# Patient Record
Sex: FEMALE | Race: WHITE | NOT HISPANIC OR LATINO | Employment: FULL TIME | URBAN - METROPOLITAN AREA
[De-identification: names, ages, dates, MRNs, and addresses within clinical notes are randomized per-mention and may not be internally consistent; named-entity substitution may affect disease eponyms.]

---

## 2017-02-20 ENCOUNTER — OFFICE VISIT (OUTPATIENT)
Dept: URGENT CARE | Facility: PHYSICIAN GROUP | Age: 32
End: 2017-02-20
Payer: COMMERCIAL

## 2017-02-20 VITALS
HEIGHT: 67 IN | TEMPERATURE: 97.9 F | WEIGHT: 199 LBS | SYSTOLIC BLOOD PRESSURE: 96 MMHG | DIASTOLIC BLOOD PRESSURE: 60 MMHG | HEART RATE: 89 BPM | OXYGEN SATURATION: 96 % | BODY MASS INDEX: 31.23 KG/M2 | RESPIRATION RATE: 16 BRPM

## 2017-02-20 DIAGNOSIS — H10.32 ACUTE BACTERIAL CONJUNCTIVITIS OF LEFT EYE: ICD-10-CM

## 2017-02-20 PROCEDURE — 99213 OFFICE O/P EST LOW 20 MIN: CPT | Performed by: FAMILY MEDICINE

## 2017-02-20 RX ORDER — POLYMYXIN B SULFATE AND TRIMETHOPRIM 1; 10000 MG/ML; [USP'U]/ML
1 SOLUTION OPHTHALMIC EVERY 4 HOURS
Qty: 1 BOTTLE | Refills: 0 | Status: SHIPPED | OUTPATIENT
Start: 2017-02-20 | End: 2017-09-22

## 2017-02-20 ASSESSMENT — ENCOUNTER SYMPTOMS
DOUBLE VISION: 0
SENSORY CHANGE: 0
WEIGHT LOSS: 0
PHOTOPHOBIA: 0
FOCAL WEAKNESS: 0
BLURRED VISION: 0

## 2017-02-20 NOTE — MR AVS SNAPSHOT
"        Stefany Barrios   2017 8:00 AM   Office Visit   MRN: [id number]    Department:  [facility]   Dept Phone:  [phone]    Description:  Female : 1985   Provider:  Babar Elliott M.D.           Reason for Visit     Eye Problem Lt eye itching, red and irritated - onset this morning      Allergies as of 2017     Allergen Noted Reactions    Latex 08/10/2015       Menthol 2016   Rash    Rash    Pcn [Penicillins] 08/10/2015         You were diagnosed with     Acute bacterial conjunctivitis of left eye   [[id number]]         Vital Signs     Blood Pressure Pulse Temperature Respirations Height Weight    96/60 mmHg 89 36.6 °C (97.9 °F) 16 1.702 m (5' 7\") 90.266 kg (199 lb)    Body Mass Index Oxygen Saturation Smoking Status             31.16 kg/m2 96% Never Smoker          Basic Information     Date Of Birth Sex Race Ethnicity Preferred Language    1985 Female White Non- English      Problem List              ICD-10-CM Priority Class Noted - Resolved    Infertility, female N97.9   8/10/2015 - Present    Prediabetes R73.03   8/10/2015 - Present    Overweight (BMI 25.0-29.9) E66.3   8/10/2015 - Present      Health Maintenance        Date Due Completion Dates    IMM INFLUENZA (1) 2016 ---    PAP SMEAR 2019, 2015 (Done)    Override on 2015: Done (Dr. Robertson-normal)    IMM DTaP/Tdap/Td Vaccine (2 - Td) 2026            Current Immunizations     Tdap Vaccine 2016      Below and/or attached are the medications your provider expects you to take. Review all of your home medications and newly ordered medications with your provider and/or pharmacist. Follow medication instructions as directed by your provider and/or pharmacist. Please keep your medication list with you and share with your provider. Update the information when medications are discontinued, doses are changed, or new medications (including over-the-counter products) are added; " and carry medication information at all times in the event of emergency situations     Allergies:  LATEX - (reactions not documented)     MENTHOL - Rash     PCN - (reactions not documented)               Medications  Valid as of: February 20, 2017 - 10:43 AM    Generic Name Brand Name Tablet Size Instructions for use    Ascorbic Acid   Take  by mouth.        Polymyxin B-Trimethoprim (Solution) POLYTRIM 65537-1.1 UNIT/ML-% Place 1 Drop in left eye every 4 hours. While awake        Prenatal MV-Min-Fe Fum-FA-DHA   Take  by mouth.        .                 Medicines prescribed today were sent to:     [id number] - [place], [place] - [address]    [address] [place] [address]    Phone: [phone] Fax: [phone]    Open 24 Hours?: No    [id number] - [place] - [address]    [address] [place] [address]    Phone: [phone] Fax: [phone]    Open 24 Hours?: No      Medication refill instructions:       If your prescription bottle indicates you have medication refills left, it is not necessary to call your provider’s office. Please contact your pharmacy and they will refill your medication.    If your prescription bottle indicates you do not have any refills left, you may request refills at any time through one of the following ways: The online [employer] system (except Urgent Care), by calling your provider’s office, or by asking your pharmacy to contact your provider’s office with a refill request. Medication refills are processed only during regular business hours and may not be available until the next business day. Your provider may request additional information or to have a follow-up visit with you prior to refilling your medication.   *Please Note: Medication refills are assigned a new Rx number when refilled electronically. Your pharmacy may indicate that no refills were authorized even though a new prescription for the same medication is available at the pharmacy. Please  request the medicine by name with the pharmacy before contacting your provider for a refill.           MyChart Access Code: Activation code not generated  Current MyChart Status: Active

## 2017-02-20 NOTE — Clinical Note
February 20, 2017         Patient: Stefany Barrios   YOB: 1985   Date of Visit: 2/20/2017           To Whom it May Concern:    Stefany Barrios was seen in my clinic on 2/20/2017. Please excuse today.      Sincerely,           Babar Elliott M.D.  Electronically Signed

## 2017-02-20 NOTE — PROGRESS NOTES
"Subjective:      Stefany Barrios is a 31 y.o. female who presents with Eye Problem            Eye Problem   The left eye is affected. This is a new problem. The current episode started today (left eye red, irritated, draining. Mild mattering this am. Pink eye exposure at home. No viral prodrome. No allergic trigger. No contact lens use. No vision loss. No trauma/FB. ). Pertinent negatives include no blurred vision, double vision, itching or photophobia.       Review of Systems   Constitutional: Negative for weight loss and malaise/fatigue.   Eyes: Negative for blurred vision, double vision and photophobia.   Skin: Negative for itching and rash.   Neurological: Negative for sensory change and focal weakness.          Objective:     BP 96/60 mmHg  Pulse 89  Temp(Src) 36.6 °C (97.9 °F)  Resp 16  Ht 1.702 m (5' 7\")  Wt 90.266 kg (199 lb)  BMI 31.16 kg/m2  SpO2 96%     Physical Exam   Constitutional: She appears well-developed and well-nourished. No distress.   HENT:   Head: Normocephalic and atraumatic.   Right Ear: External ear normal.   L conjunctiva injected with moderate exudate. No foreign body. No gross corneal lesion.     Eyes: Conjunctivae are normal.   Neck: Neck supple.   Cardiovascular: Normal rate, regular rhythm and normal heart sounds.    Pulmonary/Chest: Effort normal and breath sounds normal.   Lymphadenopathy:     She has no cervical adenopathy.   Neurological:   Speech is clear. Patient is appropriate and cooperative.     Skin: Skin is warm and dry. No rash noted.               Assessment/Plan:     There are no diagnoses linked to this encounter.      "

## 2017-06-25 ENCOUNTER — OFFICE VISIT (OUTPATIENT)
Dept: URGENT CARE | Facility: CLINIC | Age: 32
End: 2017-06-25
Payer: COMMERCIAL

## 2017-06-25 VITALS
HEIGHT: 67 IN | BODY MASS INDEX: 31.55 KG/M2 | SYSTOLIC BLOOD PRESSURE: 112 MMHG | TEMPERATURE: 98.4 F | HEART RATE: 94 BPM | OXYGEN SATURATION: 97 % | RESPIRATION RATE: 14 BRPM | DIASTOLIC BLOOD PRESSURE: 70 MMHG | WEIGHT: 201 LBS

## 2017-06-25 DIAGNOSIS — L50.9 URTICARIA: ICD-10-CM

## 2017-06-25 DIAGNOSIS — R21 RASH: ICD-10-CM

## 2017-06-25 PROCEDURE — 99214 OFFICE O/P EST MOD 30 MIN: CPT | Performed by: PHYSICIAN ASSISTANT

## 2017-06-25 RX ORDER — DEXAMETHASONE 2 MG/1
2 TABLET ORAL DAILY
Qty: 5 TAB | Refills: 0 | Status: SHIPPED | OUTPATIENT
Start: 2017-06-25 | End: 2017-09-22

## 2017-06-25 RX ORDER — METHYLPREDNISOLONE SODIUM SUCCINATE 125 MG/2ML
125 INJECTION, POWDER, LYOPHILIZED, FOR SOLUTION INTRAMUSCULAR; INTRAVENOUS ONCE
Status: COMPLETED | OUTPATIENT
Start: 2017-06-25 | End: 2017-06-25

## 2017-06-25 RX ADMIN — METHYLPREDNISOLONE SODIUM SUCCINATE 125 MG: 125 INJECTION, POWDER, LYOPHILIZED, FOR SOLUTION INTRAMUSCULAR; INTRAVENOUS at 11:54

## 2017-06-25 ASSESSMENT — ENCOUNTER SYMPTOMS
HEADACHES: 0
COUGH: 0
MYALGIAS: 0
FATIGUE: 0
RHINORRHEA: 0
EYE PAIN: 0
VOMITING: 0
SHORTNESS OF BREATH: 0
FEVER: 0
SORE THROAT: 0
DIARRHEA: 0

## 2017-06-25 NOTE — PROGRESS NOTES
"Subjective:      Stefany Barrios is a 32 y.o. female who presents with Insect Bite          Pt is 31 y/o female who presents with right upper lip swelling after suspect insect bite yesterday. She then reports that she developed hives a few hours later with intense itching. She note that she has taken 2 dosages of benadryl with improvement of her lip swelling however her hives are still intense. She denies any SOB, fevers, wheezing, or any chest tightness or throat swelling .  Rash  This is a new problem. Episode onset: last night.  Progression since onset: Lip swelling has improved. However hives are worse. The affected locations include the lips, chest, torso and back. The rash is characterized by redness and itchiness. She was exposed to an insect bite/sting (Suspected insect bite). Associated symptoms include facial edema. Pertinent negatives include no congestion, cough, diarrhea, eye pain, fatigue, fever, joint pain, rhinorrhea, shortness of breath, sore throat or vomiting. Past treatments include antihistamine. The treatment provided moderate relief.       Review of Systems   Constitutional: Negative for fever, malaise/fatigue and fatigue.   HENT: Negative for congestion, ear discharge, ear pain, rhinorrhea and sore throat.    Eyes: Negative for pain.   Respiratory: Negative for cough and shortness of breath.    Cardiovascular: Negative for chest pain and leg swelling.   Gastrointestinal: Negative for vomiting and diarrhea.   Genitourinary: Negative for dysuria and urgency.   Musculoskeletal: Negative for myalgias and joint pain.   Skin: Positive for itching and rash.   Neurological: Negative for headaches.          Objective:     /70 mmHg  Pulse 94  Temp(Src) 36.9 °C (98.4 °F)  Resp 14  Ht 1.702 m (5' 7\")  Wt 91.173 kg (201 lb)  BMI 31.47 kg/m2  SpO2 97%  LMP 06/09/2017  Breastfeeding? Yes   PMH:  has a past medical history of Diabetes (CMS-[facility]).  MEDS:   Current outpatient prescriptions:   •  " dexamethasone (DECADRON) 2 MG tablet, Take 1 Tab by mouth every day., Disp: 5 Tab, Rfl: 0  •  Prenatal MV-Min-Fe Fum-FA-DHA (PRENATAL 1 PO), Take  by mouth., Disp: , Rfl:   •  Ascorbic Acid (VITAMIN C PO), Take  by mouth., Disp: , Rfl:   •  polymixin-trimethoprim (POLYTRIM) 51636-7.1 UNIT/ML-% Solution, Place 1 Drop in left eye every 4 hours. While awake, Disp: 1 Bottle, Rfl: 0  ALLERGIES:   Allergies   Allergen Reactions   • Latex    • Menthol Rash     Rash   • Pcn [Penicillins]      SURGHX: History reviewed. No pertinent past surgical history.  SOCHX:  reports that she has never smoked. She does not have any smokeless tobacco history on file. She reports that she drinks alcohol.  FH: Family history was reviewed, no pertinent findings to report    Physical Exam   Constitutional: She is oriented to person, place, and time. She appears well-developed and well-nourished.   HENT:   Head: Normocephalic.   Right Ear: External ear normal.   Left Ear: External ear normal.   Nose: Nose normal.   Mouth/Throat: Oropharynx is clear and moist.   Right upper lip- minimal edema- without noted puncture or discrete bite noted . Without any edema to pharynx.    Eyes: EOM are normal. Pupils are equal, round, and reactive to light.   Neck: Normal range of motion. Neck supple.   Cardiovascular: Normal rate and regular rhythm.    No murmur heard.  Pulmonary/Chest: Effort normal and breath sounds normal. No respiratory distress. She has no wheezes.   Neurological: She is alert and oriented to person, place, and time.   Skin: Rash noted.   Diffuse urticarial type rash to upper arms, neck, back, and upper chest. Without noted excoriations, increased warmth, or streaking.    Psychiatric: She has a normal mood and affect. Her behavior is normal.   Vitals reviewed.              Assessment/Plan:     1. Rash  - methylPREDNISolone sod succ (SOLU-MEDROL) 125 MG injection 125 mg; 2 mL by Intramuscular route Once.  - dexamethasone (DECADRON) 2 MG  tablet; Take 1 Tab by mouth every day.  Dispense: 5 Tab; Refill: 0    2. Urticaria    - methylPREDNISolone sod succ (SOLU-MEDROL) 125 MG injection 125 mg; 2 mL by Intramuscular route Once.  - dexamethasone (DECADRON) 2 MG tablet; Take 1 Tab by mouth every day.  Dispense: 5 Tab; Refill: 0    Uncertain etiology of right lip swelling- does not appear to have puncture wound nor bite to the area.   Do suspect that this triggered urticarial rxn.   Pt. Is to continue with Benadryl and start on Zantac OTC.   Finally pt. Was given VERY STRICT ER precautions.   Patient given precautionary s/sx that mandate immediate follow up and evaluation in the ED. Advised of risks of not doing so.    DDX, Supportive care, and indications for immediate follow-up discussed with patient.    Instructed to return to clinic or nearest emergency department if we are not available for any change in condition, further concerns, or worsening of symptoms.    The patient demonstrated a good understanding and agreed with the treatment plan.

## 2017-06-25 NOTE — MR AVS SNAPSHOT
"        Stefany Barrios   2017 11:00 AM   Office Visit   MRN: [id number]    Department:  [facility]   Dept Phone:  [phone]    Description:  Female : 1985   Provider:  Daniel Shelton PA-C           Reason for Visit     Insect Bite last night on upper right lip, pt states she woke up this morning with hives on upper body      Allergies as of 2017     Allergen Noted Reactions    Latex 08/10/2015       Menthol 2016   Rash    Rash    Pcn [Penicillins] 08/10/2015         You were diagnosed with     Rash   [[id number]]       Urticaria   [[id number]]         Vital Signs     Blood Pressure Pulse Temperature Respirations Height Weight    112/70 mmHg 94 36.9 °C (98.4 °F) 14 1.702 m (5' 7\") 91.173 kg (201 lb)    Body Mass Index Oxygen Saturation Last Menstrual Period Breastfeeding? Smoking Status       31.47 kg/m2 97% 2017 Yes Never Smoker        Basic Information     Date Of Birth Sex Race Ethnicity Preferred Language    1985 Female White Non- English      Problem List              ICD-10-CM Priority Class Noted - Resolved    Infertility, female N97.9   8/10/2015 - Present    Prediabetes R73.03   8/10/2015 - Present    Overweight (BMI 25.0-29.9) E66.3   8/10/2015 - Present      Health Maintenance        Date Due Completion Dates    PAP SMEAR 2019, 2015 (Done)    Override on 2015: Done (Dr. Robertson-normal)    IMM DTaP/Tdap/Td Vaccine (2 - Td) 2026            Current Immunizations     Tdap Vaccine 2016      Below and/or attached are the medications your provider expects you to take. Review all of your home medications and newly ordered medications with your provider and/or pharmacist. Follow medication instructions as directed by your provider and/or pharmacist. Please keep your medication list with you and share with your provider. Update the information when medications are discontinued, doses are changed, or new medications (including " over-the-counter products) are added; and carry medication information at all times in the event of emergency situations     Allergies:  LATEX - (reactions not documented)     MENTHOL - Rash     PCN - (reactions not documented)               Medications  Valid as of: June 25, 2017 -  1:00 PM    Generic Name Brand Name Tablet Size Instructions for use    Ascorbic Acid   Take  by mouth.        Dexamethasone (Tab) DECADRON 2 MG Take 1 Tab by mouth every day.        Polymyxin B-Trimethoprim (Solution) POLYTRIM 21493-9.1 UNIT/ML-% Place 1 Drop in left eye every 4 hours. While awake        Prenatal MV-Min-Fe Fum-FA-DHA   Take  by mouth.        .                 Medicines prescribed today were sent to:     [id number] - [place], [place] - [address]    [address] [place] [address]    Phone: [phone] Fax: [phone]    Open 24 Hours?: No      Medication refill instructions:       If your prescription bottle indicates you have medication refills left, it is not necessary to call your provider’s office. Please contact your pharmacy and they will refill your medication.    If your prescription bottle indicates you do not have any refills left, you may request refills at any time through one of the following ways: The online "[employer] system (except Urgent Care), by calling your provider’s office, or by asking your pharmacy to contact your provider’s office with a refill request. Medication refills are processed only during regular business hours and may not be available until the next business day. Your provider may request additional information or to have a follow-up visit with you prior to refilling your medication.   *Please Note: Medication refills are assigned a new Rx number when refilled electronically. Your pharmacy may indicate that no refills were authorized even though a new prescription for the same medication is available at the pharmacy. Please request the medicine by name with the  pharmacy before contacting your provider for a refill.           MyChart Access Code: Activation code not generated  Current MyChart Status: Active

## 2017-09-22 ENCOUNTER — OFFICE VISIT (OUTPATIENT)
Dept: MEDICAL GROUP | Facility: PHYSICIAN GROUP | Age: 32
End: 2017-09-22
Payer: COMMERCIAL

## 2017-09-22 VITALS
WEIGHT: 200 LBS | SYSTOLIC BLOOD PRESSURE: 112 MMHG | DIASTOLIC BLOOD PRESSURE: 78 MMHG | HEART RATE: 92 BPM | HEIGHT: 67 IN | RESPIRATION RATE: 14 BRPM | BODY MASS INDEX: 31.39 KG/M2 | OXYGEN SATURATION: 100 % | TEMPERATURE: 97.9 F

## 2017-09-22 DIAGNOSIS — Z13.89 SCREENING FOR CARDIOVASCULAR, RESPIRATORY, AND GENITOURINARY DISEASES: ICD-10-CM

## 2017-09-22 DIAGNOSIS — E66.9 OBESITY (BMI 30-39.9): ICD-10-CM

## 2017-09-22 DIAGNOSIS — F41.9 ANXIETY: ICD-10-CM

## 2017-09-22 DIAGNOSIS — Z13.83 SCREENING FOR CARDIOVASCULAR, RESPIRATORY, AND GENITOURINARY DISEASES: ICD-10-CM

## 2017-09-22 DIAGNOSIS — Z13.6 SCREENING FOR CARDIOVASCULAR, RESPIRATORY, AND GENITOURINARY DISEASES: ICD-10-CM

## 2017-09-22 PROCEDURE — 99214 OFFICE O/P EST MOD 30 MIN: CPT | Performed by: NURSE PRACTITIONER

## 2017-09-22 ASSESSMENT — PATIENT HEALTH QUESTIONNAIRE - PHQ9: CLINICAL INTERPRETATION OF PHQ2 SCORE: 0

## 2017-09-22 NOTE — LETTER
September 22, 2017       Patient: Stefany Barrios   YOB: 1985   Date of Visit: 9/22/2017         To Whom It May Concern:    It is my medical opinion that Stefany Barrios may benefit from having a  dog due to a medical condition.    If you have any questions or concerns, please don't hesitate to call [phone]          Sincerely,          SOCORRO Busch.  Electronically Signed

## 2017-09-25 NOTE — PROGRESS NOTES
Chief Complaint   Patient presents with   • Establish Care   • Other     letter for  animal        HPI:    Stefany Barrios is a 32 y.o. female here to establish care and to discuss the following:    Anxiety  Patient has anxiety, but is currently not taking medications and would prefer not to. She denies chest tightness, shortness of breath or panic attacks. We discussed mindfulness therapy and breathing.      Current medicines (including changes today)  Current Outpatient Prescriptions   Medication Sig Dispense Refill   • Ascorbic Acid (VITAMIN C PO) Take  by mouth.     • Prenatal MV-Min-Fe Fum-FA-DHA (PRENATAL 1 PO) Take  by mouth.       No current facility-administered medications for this visit.      She  has a past medical history of Diabetes (CMS-[facility]).  She  has no past surgical history on file.  Social History   Substance Use Topics   • Smoking status: Never Smoker   • Smokeless tobacco: Never Used   • Alcohol use Yes      Comment: socially     Social History     Social History Narrative   • No narrative on file     Family History   Problem Relation Age of Onset   • Diabetes Mother      prediabetes   • Cancer Mother      skin   • Diabetes Maternal Grandfather      Family Status   Relation Status   • Mother Alive   • Maternal Grandfather Alive   • Paternal Grandfather          ROS    Review of Systems   Constitutional: Negative for fever, chills, weight loss and malaise/fatigue.   HENT: Negative for ear pain, nosebleeds, congestion, sore throat and neck pain.    Eyes: Negative for blurred vision.   Respiratory: Negative for cough, sputum production, shortness of breath and wheezing.    Cardiovascular: Negative for chest pain, palpitations,  and leg swelling.   Gastrointestinal: Negative for heartburn, nausea, vomiting, diarrhea and abdominal pain.   Genitourinary: Negative for dysuria, urgency and frequency.   Musculoskeletal: Negative for myalgias, back pain and joint pain.   Skin: Negative for  "rash and itching.   Neurological: Negative for dizziness, tingling, tremors, sensory change, focal weakness and headaches.   Endo/Heme/Allergies: Does not bruise/bleed easily.   Psychiatric/Behavioral: Negative for depression, anxiety, suicidal ideas, insomnia and memory loss.      All other systems reviewed and are negative except as in HPI.     Objective:     Blood pressure 112/78, pulse 92, temperature 36.6 °C (97.9 °F), resp. rate 14, height 1.702 m (5' 7\"), weight 90.7 kg (200 lb), last menstrual period 08/01/2017, SpO2 100 %, not currently breastfeeding. Body mass index is 31.32 kg/m².  Physical Exam:  Constitutional: Alert, no distress.  Skin: Warm, dry, good turgor, no rashes in visible areas.  Eye: Equal, round and reactive, conjunctiva clear, lids normal.  ENMT: Lips without lesions, good dentition, oropharynx clear. Ear canals are clear, TMs within normal limits bilaterally.   Neck: Trachea midline, no masses, no thyromegaly. No cervical or supraclavicular lymphadenopathy.  Respiratory: Unlabored respiratory effort, lungs clear to auscultation, no wheezes, no ronchi.  Cardiovascular: Normal S1, S2, no murmur, no edema.  Abdomen: Soft, non-tender, no masses, no hepatosplenomegaly.  Psych: Alert and oriented x3, normal affect and mood.  MS: Ambulates independently with steady gait. Has full range of motion of all extremities and spine.  Neuro:  Cranial nerves intact. DTRs within normal limits. No neurological deficits.        Assessment and Plan:   The following treatment plan was discussed   1. Obesity (BMI 30-39.9)  Patient identified as having weight management issue.  Appropriate orders and counseling given.    discussed diet and exercise   2. Screening for cardiovascular, respiratory, and genitourinary diseases  COMP METABOLIC PANEL    LIPID PROFILE    labs ordered   3. Anxiety      discussed mindfulness therapy       Followup: Return in about 1 year (around 9/22/2018), or if symptoms worsen or fail to " improve, for AWV/HRA.   Please note that this dictation was created using voice recognition software. I have made every reasonable attempt to correct obvious errors, but I expect that there are errors of grammar and possibly content that I did not discover before finalizing the note.

## 2017-10-09 NOTE — ASSESSMENT & PLAN NOTE
Patient has anxiety, but is currently not taking medications and would prefer not to. She denies chest tightness, shortness of breath or panic attacks. We discussed mindfulness therapy and breathing.

## 2019-08-24 ENCOUNTER — OFFICE VISIT (OUTPATIENT)
Dept: URGENT CARE | Facility: PHYSICIAN GROUP | Age: 34
End: 2019-08-24
Payer: COMMERCIAL

## 2019-08-24 VITALS
WEIGHT: 190.2 LBS | HEART RATE: 77 BPM | SYSTOLIC BLOOD PRESSURE: 112 MMHG | BODY MASS INDEX: 29.79 KG/M2 | TEMPERATURE: 98.9 F | DIASTOLIC BLOOD PRESSURE: 76 MMHG | OXYGEN SATURATION: 99 %

## 2019-08-24 DIAGNOSIS — L02.212 ABSCESS OF UPPER BACK EXCLUDING SCAPULAR REGION: ICD-10-CM

## 2019-08-24 PROCEDURE — 99214 OFFICE O/P EST MOD 30 MIN: CPT | Performed by: PHYSICIAN ASSISTANT

## 2019-08-24 RX ORDER — SULFAMETHOXAZOLE AND TRIMETHOPRIM 800; 160 MG/1; MG/1
1 TABLET ORAL 2 TIMES DAILY
Qty: 20 TAB | Refills: 0 | Status: SHIPPED | OUTPATIENT
Start: 2019-08-24

## 2019-08-25 ASSESSMENT — ENCOUNTER SYMPTOMS: FEVER: 0

## 2019-08-25 NOTE — PROGRESS NOTES
Subjective:      Stefany Barrios is a 34 y.o. female who presents with Cyst (possible abscess on right shoulder x 3 weeks)    PMH:  has a past medical history of Diabetes (HCC).  MEDS:   Current Outpatient Medications:   •  Ascorbic Acid (VITAMIN C PO), Take  by mouth., Disp: , Rfl:   •  Prenatal MV-Min-Fe Fum-FA-DHA (PRENATAL 1 PO), Take  by mouth., Disp: , Rfl:   ALLERGIES:   Allergies   Allergen Reactions   • Latex    • Menthol Rash     Rash   • Pcn [Penicillins]      All cillians     SURGHX: History reviewed. No pertinent surgical history.  SOCHX:  reports that she has never smoked. She has never used smokeless tobacco. She reports that she drinks alcohol.  FH: Reviewed with patient, not pertinent to this visit.           Patient presents with:  Cyst: possible abscess on right shoulder x 3 weeks. Pt states her  tried to drain it with a needle, and did get a lot of pus from it, but now sore and red and swollen.  Thinks she needs some abx.         Cyst   This is a new problem. The current episode started 1 to 4 weeks ago. The problem occurs constantly. The problem has been gradually worsening. Pertinent negatives include no fever. Exacerbated by: palpation, pressure on cyst. Treatments tried: tried to drain it. The treatment provided mild relief.       Review of Systems   Constitutional: Negative for fever.   Skin:        Cyst to upper back   All other systems reviewed and are negative.         Objective:     /76 (BP Location: Left arm, Patient Position: Sitting, BP Cuff Size: Adult)   Pulse 77   Temp 37.2 °C (98.9 °F) (Temporal)   Wt 86.3 kg (190 lb 3.2 oz)   SpO2 99%   BMI 29.79 kg/m²      Physical Exam   Constitutional: She is oriented to person, place, and time. She appears well-developed and well-nourished.   HENT:   Head: Normocephalic and atraumatic.   Nose: Nose normal.   Eyes: Pupils are equal, round, and reactive to light. Conjunctivae and EOM are normal.   Neck: Normal range of motion.  Neck supple.   Cardiovascular: Normal rate, regular rhythm and normal heart sounds.   Pulmonary/Chest: Effort normal and breath sounds normal.   Abdominal: Soft.   Musculoskeletal: Normal range of motion.   Neurological: She is alert and oriented to person, place, and time. Gait normal.   Skin: Skin is warm and dry. Capillary refill takes less than 2 seconds.        Psychiatric: She has a normal mood and affect.   Nursing note and vitals reviewed.              Assessment/Plan:     1. Abscess of upper back excluding scapular region  sulfamethoxazole-trimethoprim (BACTRIM DS) 800-160 MG tablet     No need for I and D but antibiotics are indicated.   Pt instructed not to try to drain herself in future.     Motrin/Advil/Ibuprophen 600 mg every 6 hours as needed for pain or fever.    PT can use cool/warm compress on affected area for relief of symptoms.     PT should follow up with PCP in 1-2 days for re-evaluation if symptoms have not improved.  Discussed red flags and reasons to return to UC or ED.  Pt and/or family verbalized understanding of diagnosis and follow up instructions and was offered informational handout on diagnosis.  PT discharged.

## 2019-09-02 ENCOUNTER — OFFICE VISIT (OUTPATIENT)
Dept: URGENT CARE | Facility: PHYSICIAN GROUP | Age: 34
End: 2019-09-02
Payer: COMMERCIAL

## 2019-09-02 VITALS
HEIGHT: 67 IN | HEART RATE: 76 BPM | BODY MASS INDEX: 29.98 KG/M2 | RESPIRATION RATE: 16 BRPM | DIASTOLIC BLOOD PRESSURE: 72 MMHG | SYSTOLIC BLOOD PRESSURE: 122 MMHG | TEMPERATURE: 98 F | WEIGHT: 191 LBS | OXYGEN SATURATION: 96 %

## 2019-09-02 DIAGNOSIS — L72.3 INFLAMED SEBACEOUS CYST: ICD-10-CM

## 2019-09-02 DIAGNOSIS — I88.9 OCCIPITAL LYMPHADENITIS: ICD-10-CM

## 2019-09-02 DIAGNOSIS — T50.905A ALLERGIC REACTION DUE TO CORRECT MEDICINAL SUBSTANCE PROPERLY ADMINISTERED: ICD-10-CM

## 2019-09-02 PROCEDURE — 99214 OFFICE O/P EST MOD 30 MIN: CPT | Performed by: EMERGENCY MEDICINE

## 2019-09-02 RX ORDER — DOXYCYCLINE HYCLATE 100 MG
100 TABLET ORAL 2 TIMES DAILY
Qty: 14 TAB | Refills: 0 | Status: SHIPPED | OUTPATIENT
Start: 2019-09-02

## 2019-09-02 ASSESSMENT — ENCOUNTER SYMPTOMS
MYALGIAS: 0
VOMITING: 0
FEVER: 0
DIARRHEA: 1
BLOOD IN STOOL: 0
HEADACHES: 0
COUGH: 0
SHORTNESS OF BREATH: 0
SORE THROAT: 0

## 2019-09-02 NOTE — PROGRESS NOTES
Subjective:      Stefany Barrios is a 34 y.o. female who presents with Nodule (Painful lump on base of neck x4days )            Other   This is a new problem. Episode onset: 4 days. The problem occurs daily. The problem has been unchanged. Associated symptoms include a rash. Pertinent negatives include no congestion, coughing, fever, headaches, myalgias, sore throat or vomiting. Nothing aggravates the symptoms. She has tried nothing for the symptoms.   Rash   This is a new problem. The current episode started today. The problem has been gradually worsening since onset. The affected locations include the scalp, neck, torso, left arm and right arm. The rash is characterized by redness and itchiness. She was exposed to a new medication. Associated symptoms include diarrhea. Pertinent negatives include no congestion, cough, facial edema, fever, joint pain, shortness of breath, sore throat or vomiting. Past treatments include nothing.   Patient had infected right upper back sebaceous cyst; after drainage had been prescribed Bactrim.  Notes mildly tender lumps occipital regions, today noted rash, minimally itchy.    Review of Systems   Constitutional: Negative for fever.   HENT: Negative for congestion and sore throat.    Respiratory: Negative for cough and shortness of breath.    Gastrointestinal: Positive for diarrhea. Negative for blood in stool and vomiting.   Musculoskeletal: Negative for joint pain and myalgias.   Skin: Positive for itching and rash.   Neurological: Negative for headaches.     PMH:  has a past medical history of Diabetes ([facility]).  MEDS:   Current Outpatient Medications:   •  doxycycline (VIBRAMYCIN) 100 MG Tab, Take 1 Tab by mouth 2 times a day., Disp: 14 Tab, Rfl: 0  •  sulfamethoxazole-trimethoprim (BACTRIM DS) 800-160 MG tablet, Take 1 Tab by mouth 2 times a day., Disp: 20 Tab, Rfl: 0  •  Ascorbic Acid (VITAMIN C PO), Take  by mouth., Disp: , Rfl:   •  Prenatal MV-Min-Fe Fum-FA-DHA (PRENATAL 1 PO),  "Take  by mouth., Disp: , Rfl:   ALLERGIES:   Allergies   Allergen Reactions   • Latex    • Menthol Rash     Rash   • Pcn [Penicillins]      All cillians     SURGHX: History reviewed. No pertinent surgical history.  SOCHX:  reports that she has never smoked. She has never used smokeless tobacco. She reports that she drinks alcohol.  FH: family history includes Cancer in her mother; Diabetes in her maternal grandfather and mother.     Objective:     /72   Pulse 76   Temp 36.7 °C (98 °F) (Temporal)   Resp 16   Ht 1.702 m (5' 7\")   Wt 86.6 kg (191 lb)   SpO2 96%   Breastfeeding? No   BMI 29.91 kg/m²      Physical Exam   Constitutional: She is oriented to person, place, and time. She appears well-developed and well-nourished. She is cooperative. She does not have a sickly appearance. She does not appear ill. No distress.   Cardiovascular: Normal rate, regular rhythm and normal heart sounds.   Pulmonary/Chest: Effort normal and breath sounds normal.   Abdominal: She exhibits no distension.   Lymphadenopathy:        Head (right side): Posterior auricular and occipital adenopathy present.        Head (left side): Posterior auricular and occipital adenopathy present.     She has no cervical adenopathy.   Neurological: She is alert and oriented to person, place, and time.   Skin: Skin is warm and dry. Lesion and rash noted. Rash is maculopapular.        Coalescent macular, minimally papular eruption occipital region, neck, bilateral forearms.  No petechia, no cellulitis.   Psychiatric: She has a normal mood and affect.               Assessment/Plan:     1. Allergic reaction due to correct medicinal substance properly administered  Discontinue Bactrim; avoid future sulfa medication use.  OTC cetirizine, cool compresses as needed.  2. Occipital lymphadenitis  Recheck if not resolving after allergy process gone.  3. Inflamed sebaceous cyst  Warm compresses.  If not resolving, consider adding in 2 to 3 days:  - " doxycycline (VIBRAMYCIN) 100 MG Tab; Take 1 Tab by mouth 2 times a day.  Dispense: 14 Tab; Refill: 0

## 2019-10-10 ENCOUNTER — OFFICE VISIT (OUTPATIENT)
Dept: URGENT CARE | Facility: PHYSICIAN GROUP | Age: 34
End: 2019-10-10
Payer: COMMERCIAL

## 2019-10-10 VITALS
RESPIRATION RATE: 16 BRPM | SYSTOLIC BLOOD PRESSURE: 106 MMHG | HEART RATE: 101 BPM | OXYGEN SATURATION: 96 % | HEIGHT: 67 IN | BODY MASS INDEX: 29.98 KG/M2 | DIASTOLIC BLOOD PRESSURE: 78 MMHG | WEIGHT: 191 LBS | TEMPERATURE: 98.7 F
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
## 2019-10-10 DIAGNOSIS — S16.1XXA CERVICAL MUSCLE STRAIN, INITIAL ENCOUNTER: Primary | ICD-10-CM

## 2019-10-10 PROCEDURE — 99214 OFFICE O/P EST MOD 30 MIN: CPT | Performed by: PHYSICIAN ASSISTANT

## 2019-10-10 RX ORDER — CYCLOBENZAPRINE HCL 10 MG
10 TABLET ORAL 3 TIMES DAILY PRN
Qty: 30 TAB | Refills: 0 | Status: SHIPPED | OUTPATIENT
Start: 2019-10-10

## 2019-10-10 NOTE — LETTER
October 10, 2019         Patient: Stefany Barrios   YOB: 1985   Date of Visit: 10/10/2019           To Whom it May Concern:    Stefany Barrios was seen in my clinic on 10/10/2019. She may return to work on 10/14/2019 or sooner if condition improves sooner.       If you have any questions or concerns, please don't hesitate to call.        Sincerely,           Felecia Cheney P.A.-C.  Electronically Signed

## 2019-10-12 ASSESSMENT — ENCOUNTER SYMPTOMS
VISUAL CHANGE: 0
NUMBNESS: 0
ABDOMINAL PAIN: 0
HEADACHES: 1
ANOREXIA: 0
NECK PAIN: 1
MYALGIAS: 1
VERTIGO: 0
FEVER: 0
CHANGE IN BOWEL HABIT: 0
ARTHRALGIAS: 0
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
## 2020-03-31 ENCOUNTER — OFFICE VISIT (OUTPATIENT)
Dept: URGENT CARE | Facility: PHYSICIAN GROUP | Age: 35
End: 2020-03-31
Payer: COMMERCIAL

## 2020-03-31 VITALS
SYSTOLIC BLOOD PRESSURE: 94 MMHG | HEIGHT: 67 IN | OXYGEN SATURATION: 98 % | TEMPERATURE: 97.6 F | WEIGHT: 190 LBS | BODY MASS INDEX: 29.82 KG/M2 | DIASTOLIC BLOOD PRESSURE: 72 MMHG | HEART RATE: 86 BPM

## 2020-03-31 DIAGNOSIS — H93.8X2 SENSATION OF FULLNESS IN LEFT EAR: ICD-10-CM

## 2020-03-31 PROCEDURE — 99213 OFFICE O/P EST LOW 20 MIN: CPT | Performed by: PHYSICIAN ASSISTANT

## 2020-03-31 RX ORDER — LORATADINE 10 MG/1
10 TABLET ORAL DAILY
COMMUNITY

## 2020-03-31 ASSESSMENT — ENCOUNTER SYMPTOMS
SORE THROAT: 0
MUSCULOSKELETAL NEGATIVE: 1
FEVER: 0
DIZZINESS: 0
CHILLS: 0
NAUSEA: 0
ABDOMINAL PAIN: 0
COUGH: 0
SHORTNESS OF BREATH: 0
SPUTUM PRODUCTION: 0
VOMITING: 0
DIARRHEA: 0

## 2020-03-31 NOTE — PROGRESS NOTES
"Subjective:      Stefany Barrios is a 34 y.o. female who presents with Ear Fullness (L ear)            Ear Fullness   This is a new problem. The current episode started yesterday. The problem occurs constantly. The problem has been waxing and waning. Pertinent negatives include no abdominal pain, chest pain, chills, congestion, coughing, fever, nausea, rash, sore throat or vomiting. Nothing aggravates the symptoms. She has tried nothing for the symptoms.     Patient presents to urgent care reporting left ear fullness with intermittent sharp pains starting last night. She states the ear is feeling better today. No fevers, chills, body aches, cough, congestion, sinus pressure, ear discharge, hearing loss, or tinnitus.     Review of Systems   Constitutional: Negative for chills and fever.   HENT: Negative for congestion, ear pain, hearing loss, sore throat and tinnitus.         + ear fullness   Respiratory: Negative for cough, sputum production and shortness of breath.    Cardiovascular: Negative for chest pain.   Gastrointestinal: Negative for abdominal pain, diarrhea, nausea and vomiting.   Genitourinary: Negative.    Musculoskeletal: Negative.    Skin: Negative for rash.   Neurological: Negative for dizziness.        Objective:     BP (!) 94/72   Pulse 86   Temp 36.4 °C (97.6 °F) (Temporal)   Ht 1.702 m (5' 7\")   Wt 86.2 kg (190 lb)   SpO2 98%   BMI 29.76 kg/m²        Physical Exam  Vitals signs and nursing note reviewed.   Constitutional:       General: She is not in acute distress.     Appearance: Normal appearance. She is well-developed. She is not diaphoretic.   HENT:      Head: Normocephalic and atraumatic.      Right Ear: Hearing, tympanic membrane, ear canal and external ear normal. There is no impacted cerumen.      Left Ear: Hearing, tympanic membrane, ear canal and external ear normal. There is no impacted cerumen.      Nose: Nose normal. No congestion or rhinorrhea.      Mouth/Throat:      Mouth: " Mucous membranes are moist.      Pharynx: No oropharyngeal exudate or posterior oropharyngeal erythema.   Eyes:      General:         Right eye: No discharge.         Left eye: No discharge.      Conjunctiva/sclera: Conjunctivae normal.      Pupils: Pupils are equal, round, and reactive to light.   Neck:      Musculoskeletal: Normal range of motion.   Cardiovascular:      Rate and Rhythm: Normal rate and regular rhythm.      Heart sounds: Normal heart sounds. No murmur.   Pulmonary:      Effort: Pulmonary effort is normal.      Breath sounds: Normal breath sounds. No wheezing or rales.   Musculoskeletal: Normal range of motion.   Skin:     General: Skin is warm and dry.   Neurological:      Mental Status: She is alert and oriented to person, place, and time.   Psychiatric:         Behavior: Behavior normal.            PMH:  has a past medical history of Diabetes ([facility]).  MEDS:   Current Outpatient Medications:   •  loratadine (CLARITIN) 10 MG Tab, Take 10 mg by mouth every day., Disp: , Rfl:   •  Prenatal MV-Min-Fe Fum-FA-DHA (PRENATAL 1 PO), Take  by mouth., Disp: , Rfl:   •  cyclobenzaprine (FLEXERIL) 10 MG Tab, Take 1 Tab by mouth 3 times a day as needed., Disp: 30 Tab, Rfl: 0  •  doxycycline (VIBRAMYCIN) 100 MG Tab, Take 1 Tab by mouth 2 times a day. (Patient not taking: Reported on 10/10/2019), Disp: 14 Tab, Rfl: 0  •  sulfamethoxazole-trimethoprim (BACTRIM DS) 800-160 MG tablet, Take 1 Tab by mouth 2 times a day. (Patient not taking: Reported on 10/10/2019), Disp: 20 Tab, Rfl: 0  •  Ascorbic Acid (VITAMIN C PO), Take  by mouth., Disp: , Rfl:   ALLERGIES:   Allergies   Allergen Reactions   • Sulfa Drugs Hives   • Latex    • Menthol Rash     Rash   • Pcn [Penicillins]      All cillians     SURGHX: History reviewed. No pertinent surgical history.  SOCHX:  reports that she has never smoked. She has never used smokeless tobacco. She reports current alcohol use.  FH: family history includes Cancer in her mother;  Diabetes in her maternal grandfather and mother.       Assessment/Plan:       1. Sensation of fullness in left ear    No evidence of AOM or AOE at today's visit. Encouraged use of OTC antihistamines, flonase nasal spray, and OTC tylenol or ibuprofen as needed for pain. Monitor closely and RTC or follow up with primary care if symptoms persist/worsen. The patient demonstrated a good understanding and agreed with the treatment plan.